# Patient Record
Sex: FEMALE | Race: ASIAN | NOT HISPANIC OR LATINO
[De-identification: names, ages, dates, MRNs, and addresses within clinical notes are randomized per-mention and may not be internally consistent; named-entity substitution may affect disease eponyms.]

---
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
## 2022-01-27 ENCOUNTER — APPOINTMENT (OUTPATIENT)
Dept: PEDIATRIC RHEUMATOLOGY | Facility: CLINIC | Age: 22
End: 2022-01-27
Payer: COMMERCIAL

## 2022-01-27 VITALS
DIASTOLIC BLOOD PRESSURE: 73 MMHG | BODY MASS INDEX: 17.31 KG/M2 | SYSTOLIC BLOOD PRESSURE: 114 MMHG | HEART RATE: 101 BPM | WEIGHT: 88.18 LBS | TEMPERATURE: 97.6 F | HEIGHT: 59.88 IN

## 2022-01-27 DIAGNOSIS — M79.89 PAIN IN RIGHT TOE(S): ICD-10-CM

## 2022-01-27 DIAGNOSIS — M79.675 PAIN IN LEFT TOE(S): ICD-10-CM

## 2022-01-27 DIAGNOSIS — M79.89 PAIN IN LEFT TOE(S): ICD-10-CM

## 2022-01-27 DIAGNOSIS — M79.674 PAIN IN RIGHT TOE(S): ICD-10-CM

## 2022-01-27 PROCEDURE — 99215 OFFICE O/P EST HI 40 MIN: CPT

## 2022-01-27 RX ORDER — NAPROXEN SODIUM 220 MG
220 TABLET ORAL
Qty: 120 | Refills: 2 | Status: COMPLETED | COMMUNITY
Start: 2021-10-28 | End: 2022-01-27

## 2022-01-27 RX ORDER — MELOXICAM 10 MG/1
10 CAPSULE ORAL
Qty: 30 | Refills: 2 | Status: COMPLETED | COMMUNITY
Start: 2022-01-27 | End: 2022-01-27

## 2022-01-27 NOTE — HISTORY OF PRESENT ILLNESS
[FreeTextEntry1] : Melo is here for follow-up today with Mom.\par \par Melo reports feeling well overall. She did have one episode of right sided facial swelling in 11/23/2021. Denies any concurrent illness or any triggers, but notable swelling of R. cheek with some mild dryness in her mouth. PCP prescribed her steroid taper with resolution of symptoms in 5 days. She has not had recurrence seen.\par \par Melo also reports that she had bilateral knee swelling earlier this month. She took Aleve BID for a week and has had resolution of her pain symptoms, but still has swelling of her right knee. She does not report any AM stiffness or any limitation due to her knee swelling. Denies any other joint symptoms. \par \par She denies any abdominal pain, vomiting, nausea, diarrhea, early satiety, or dysphagia. Denies xerostomia, xerophthalmia, or further swelling of cheeks. Has been compliant with HCQ qD and artificial tears PRN. Denies eye pain, vision changes/blurry vision, rashes, fevers, recent illnesses, alopecia, fatigue, constant thirst, or other joint symptoms. \par

## 2022-01-27 NOTE — SOCIAL HISTORY
[FreeTextEntry1] : Attending [employer] (has 2 more classes until completes her degree) and wants to study human services and mental health (maybe wants to go into health admin vs apply to [employer] accelerated nursing program. She is currently working in medical billing and accounting.

## 2022-01-27 NOTE — END OF VISIT
[FreeTextEntry3] : Agree with fellow as above.  \par \par I discussed this patient in a pre-clinic session with the fellow including review of clinical status, last labs, and relevant notes from other providers.  I also saw the patient and discussed history, completed an exam and discussed the treatment/management and follow-up together with the fellow.  \par   [Time Spent: ___ minutes] : I have spent [unfilled] minutes of time on the encounter.

## 2022-01-27 NOTE — REVIEW OF SYSTEMS
[Fever] : no fever [Wgt Loss (___ Lbs)] : no recent weight loss [Smokers in Home] : no one in home smokes [FreeTextEntry1] : Flu shot given in rheumatology office [id number]\par COVID vaccine x2 doses completed 5/2021

## 2022-01-27 NOTE — CONSULT LETTER
[FreeTextEntry2] : Blake Ji MD\par [address]\[place] [address]  [FreeTextEntry3] : Negrita Almeida DO\par Fellow, Pediatric Rheumatology\par [facility]\par

## 2022-01-27 NOTE — PHYSICAL EXAM
[Acute distress] : no acute distress [Rash] : no rash [Malar Erythema] : no malar erythema [Erythematous Conjunctiva] : nonerythematous conjunctiva [Erythematous Oropharynx] : nonerythematous oropharynx [Lesions] : no lesions [Erythematous] : not erythematous [Murmurs] : no murmurs [Peripheral Edema] : no peripheral edema  [FreeTextEntry1] : NAD, able to get on and off table without difficulty  [NumbJointsActiveArthritis] : 1 [NumbJointsLimitedMotion] : 0 [de-identified] : FROM C-Spine [de-identified] : no gross discrepancy

## 2022-01-28 ENCOUNTER — NON-APPOINTMENT (OUTPATIENT)
Age: 22
End: 2022-01-28

## 2022-01-28 LAB
25(OH)D3 SERPL-MCNC: 36 NG/ML
ALBUMIN SERPL ELPH-MCNC: 4.1 G/DL
ALP BLD-CCNC: 45 U/L
ALT SERPL-CCNC: 15 U/L
ANION GAP SERPL CALC-SCNC: 12 MMOL/L
AST SERPL-CCNC: 19 U/L
BILIRUB SERPL-MCNC: 0.3 MG/DL
BUN SERPL-MCNC: 8 MG/DL
C3 SERPL-MCNC: 114 MG/DL
C4 SERPL-MCNC: 15 MG/DL
CALCIUM SERPL-MCNC: 9 MG/DL
CHLORIDE SERPL-SCNC: 104 MMOL/L
CO2 SERPL-SCNC: 20 MMOL/L
CREAT SERPL-MCNC: 0.62 MG/DL
CRP SERPL-MCNC: <3 MG/L
FERRITIN SERPL-MCNC: 12 NG/ML
GLUCOSE SERPL-MCNC: 81 MG/DL
IRON SATN MFR SERPL: 14 %
IRON SERPL-MCNC: 62 UG/DL
POTASSIUM SERPL-SCNC: 4.2 MMOL/L
PROT SERPL-MCNC: 8.1 G/DL
SODIUM SERPL-SCNC: 136 MMOL/L
TIBC SERPL-MCNC: 439 UG/DL
UIBC SERPL-MCNC: 377 UG/DL

## 2022-02-01 LAB — DSDNA AB SER-ACNC: <12 IU/ML

## 2022-03-10 ENCOUNTER — APPOINTMENT (OUTPATIENT)
Dept: PEDIATRIC RHEUMATOLOGY | Facility: CLINIC | Age: 22
End: 2022-03-10
Payer: COMMERCIAL

## 2022-03-10 VITALS
DIASTOLIC BLOOD PRESSURE: 66 MMHG | BODY MASS INDEX: 17.51 KG/M2 | HEIGHT: 60.04 IN | TEMPERATURE: 98.1 F | SYSTOLIC BLOOD PRESSURE: 100 MMHG | HEART RATE: 89 BPM | WEIGHT: 90.39 LBS

## 2022-03-10 PROCEDURE — 20610 DRAIN/INJ JOINT/BURSA W/O US: CPT | Mod: GC

## 2022-03-10 PROCEDURE — 99215 OFFICE O/P EST HI 40 MIN: CPT | Mod: GC,25

## 2022-03-10 NOTE — PHYSICAL EXAM
[PERRLA] : ZACKARY [Eyelids] : normal eyelids [Pupils] : pupils were equal and round [Iris] : normal iris [Gums] : normal gums [Mucosa] : moist and pink mucosa [Palate] : normal palate [S1, S2 Present] : S1, S2 present [Cardiac Auscultation] : normal cardiac auscultation  [Peripheral Pulses] : positive peripheral pulses [Respiratory Effort] : normal respiratory effort [Clear to auscultation] : clear to auscultation [Soft] : soft [NonTender] : non tender [Non Distended] : non distended [Normal Bowel Sounds] : normal bowel sounds [No Hepatosplenomegaly] : no hepatosplenomegaly [No Abnormal Lymph Nodes Palpated] : no abnormal lymph nodes palpated [Digits] : normal digits [Muscle Strength] : normal muscle strength [Range Of Motion] : full range of motion [Gait] : normal gait [Joint effusions] : joint effusions [Cranial nerves grossly intact] : cranial nerves grossly intact [Intact Judgement] : intact judgement  [Insight Insight] : intact insight [1] : 1 [_______] : Knee: [unfilled]  [Thumbs bend back to reach forearm] : thumbs bend back to reach forearm [Hyperextension of elbows] : hyperextension of elbows  [Hyperextension of knees] : hyperextension of knees [Acute distress] : no acute distress [Rash] : no rash [Malar Erythema] : no malar erythema [Erythematous Conjunctiva] : nonerythematous conjunctiva [Erythematous Oropharynx] : nonerythematous oropharynx [Lesions] : no lesions [Erythematous] : not erythematous [Murmurs] : no murmurs [Peripheral Edema] : no peripheral edema  [FreeTextEntry1] : NAD, able to get on and off table without difficulty  [NumbJointsActiveArthritis] : 1 [NumbJointsLimitedMotion] : 0 [de-identified] : FROM C-Spine [de-identified] : no gross discrepancy

## 2022-03-10 NOTE — SOCIAL HISTORY
[Mother] : mother [Father] : father [___ Brothers] : [unfilled] brothers [College] : College [FreeTextEntry1] : Attending [employer] (has 2 more classes until completes her degree) and wants to study human services and mental health (maybe wants to go into health admin vs apply to [employer] accelerated nursing program. She is currently working in medical billing and accounting.

## 2022-03-10 NOTE — IMMUNIZATIONS
[Immunizations are up to date] : Immunizations are up to date [Records maintained by PMANMOL] : Records maintained by GRISEL [FreeTextEntry1] : Flu shot given in rheumatology office [id number]\par COVID vaccine x2 doses completed 5/2021 - booster 2/17/2022

## 2022-03-10 NOTE — PHYSICAL EXAM
[PERRLA] : ZACKARY [Eyelids] : normal eyelids [Pupils] : pupils were equal and round [Iris] : normal iris [Gums] : normal gums [Mucosa] : moist and pink mucosa [Palate] : normal palate [S1, S2 Present] : S1, S2 present [Cardiac Auscultation] : normal cardiac auscultation  [Peripheral Pulses] : positive peripheral pulses [Respiratory Effort] : normal respiratory effort [Clear to auscultation] : clear to auscultation [Soft] : soft [NonTender] : non tender [Non Distended] : non distended [Normal Bowel Sounds] : normal bowel sounds [No Hepatosplenomegaly] : no hepatosplenomegaly [No Abnormal Lymph Nodes Palpated] : no abnormal lymph nodes palpated [Digits] : normal digits [Muscle Strength] : normal muscle strength [Range Of Motion] : full range of motion [Gait] : normal gait [Joint effusions] : joint effusions [Cranial nerves grossly intact] : cranial nerves grossly intact [Intact Judgement] : intact judgement  [Insight Insight] : intact insight [1] : 1 [_______] : Knee: [unfilled]  [Thumbs bend back to reach forearm] : thumbs bend back to reach forearm [Hyperextension of elbows] : hyperextension of elbows  [Hyperextension of knees] : hyperextension of knees [Acute distress] : no acute distress [Rash] : no rash [Malar Erythema] : no malar erythema [Erythematous Conjunctiva] : nonerythematous conjunctiva [Erythematous Oropharynx] : nonerythematous oropharynx [Lesions] : no lesions [Erythematous] : not erythematous [Murmurs] : no murmurs [Peripheral Edema] : no peripheral edema  [FreeTextEntry1] : NAD, able to get on and off table without difficulty  [NumbJointsLimitedMotion] : 0 [NumbJointsActiveArthritis] : 1 [de-identified] : FROM C-Spine [de-identified] : no gross discrepancy

## 2022-03-10 NOTE — SOCIAL HISTORY
[Mother] : mother [Father] : father [___ Brothers] : [unfilled] brothers [College] : College [FreeTextEntry1] : Attending [employer] (has 2 more classes until completes her degree) and wants to study human services and mental health (maybe wants to go into health admin vs apply to [employer] accelerated nursing program. She is currently working in medical billing and accounting.

## 2022-03-10 NOTE — REVIEW OF SYSTEMS
Hpi Title: Evaluation of Skin Lesions How Severe Are Your Spot(S)?: moderate Have Your Spot(S) Been Treated In The Past?: has not been treated [NI] : Endocrine [Nl] : Hematologic/Lymphatic [Date of Last Ophto Exam: _____] : the patient's last Ophthalmology exam was [unfilled] [Appropriate Age Development] : development appropriate for age [Menarche] : ~T menarche [Fever] : no fever [Wgt Loss (___ Lbs)] : no recent weight loss [Smokers in Home] : no one in home smokes

## 2022-03-10 NOTE — IMMUNIZATIONS
[Immunizations are up to date] : Immunizations are up to date [Records maintained by PMANMOL] : Records maintained by GRISEL [FreeTextEntry1] : Flu shot given in rheumatology office [id number]\par COVID vaccine x2 doses completed 5/2021 - booster 2/17/2022

## 2022-03-10 NOTE — CONSULT LETTER
[Dear  ___] : Dear  [unfilled], [Courtesy Letter:] : I had the pleasure of seeing your patient, [unfilled], in my office today. [Consult Closing:] : Thank you very much for allowing me to participate in the care of this patient.  If you have any questions, please do not hesitate to contact me. [Please see my note below.] : Please see my note below. [Sincerely,] : Sincerely, [FreeTextEntry2] : Blake Ji MD\par [address]\[place] [address]  [FreeTextEntry3] : Negrita Almeida DO\par Fellow, Pediatric Rheumatology\par [facility]\par

## 2022-03-10 NOTE — REVIEW OF SYSTEMS
[NI] : Endocrine [Nl] : Hematologic/Lymphatic [Date of Last Ophto Exam: _____] : the patient's last Ophthalmology exam was [unfilled] [Appropriate Age Development] : development appropriate for age [Menarche] : ~T menarche [Fever] : no fever [Wgt Loss (___ Lbs)] : no recent weight loss [Smokers in Home] : no one in home smokes

## 2022-03-10 NOTE — HISTORY OF PRESENT ILLNESS
[Noncontributory] : The patient's family history was noncontributory [Unlimited ADLs] : able to do activities of daily living without limitations [Unlimited Sports] : able to participate in sports without limitations [FreeTextEntry1] : Melo is here for follow-up today. Mom joined via [employer] today. \par \par Melo reports that she continues to have right knee swelling since our last encounter. She denies any pain or limping, but reports stiffness after sitting with her legs crossed. Denies other joint symptoms. Has been taking Meloxicam daily ~5 weeks. \par \par In early February, Melo had a few days of fevers, sore throat, and swelling of her tonsils (diagnosed with ?tonsillitis). PCP prescribed Prednisone x5 days, antibiotics, cough medicine, and ibuprofen. She was COVID negative at this time. \par \olamide Denies any abdominal pain, vomiting, nausea, diarrhea, early satiety, or dysphagia. Denies xerostomia, xerophthalmia, or further swelling of cheeks. Has been compliant with HCQ qD and artificial tears PRN. Denies eye pain, vision changes/blurry vision, rashes, fevers, recent illnesses, alopecia, fatigue, constant thirst, or other joint symptoms.\par

## 2022-03-10 NOTE — HISTORY OF PRESENT ILLNESS
[Noncontributory] : The patient's family history was noncontributory [Unlimited ADLs] : able to do activities of daily living without limitations [Unlimited Sports] : able to participate in sports without limitations [FreeTextEntry1] : Melo is here for follow-up today. Mom joined via [employer] today. \par \par Melo reports that she continues to have right knee swelling since our last encounter. She denies any pain or limping, but reports stiffness after sitting with her legs crossed. Denies other joint symptoms. Has been taking Meloxicam daily ~5 weeks. \par \par In early February, Melo had a few days of fevers, sore throat, and swelling of her tonsils (diagnosed with ?tonsillitis). PCP prescribed Prednisone x5 days, antibiotics, cough medicine, and ibuprofen. She was COVID negative at this time. \par \olamide Denies any abdominal pain, vomiting, nausea, diarrhea, early satiety, or dysphagia. Denies xerostomia, xerophthalmia, or further swelling of cheeks. Has been compliant with HCQ qD and artificial tears PRN. Denies eye pain, vision changes/blurry vision, rashes, fevers, recent illnesses, alopecia, fatigue, constant thirst, or other joint symptoms.\par

## 2022-03-10 NOTE — CONSULT LETTER
[Dear  ___] : Dear  [unfilled], [Courtesy Letter:] : I had the pleasure of seeing your patient, [unfilled], in my office today. [Consult Closing:] : Thank you very much for allowing me to participate in the care of this patient.  If you have any questions, please do not hesitate to contact me. [Please see my note below.] : Please see my note below. [Sincerely,] : Sincerely, [FreeTextEntry2] : Blake Ji MD\par [address]\[place] [address]  [FreeTextEntry3] : Negrita Almeida DO\par Fellow, Pediatric Rheumatology\par [facility]\par

## 2022-03-11 NOTE — PROCEDURE
[Today's Date:] : Date: [unfilled] [Risks] : risks [Parent] : parent [Benefits] : benefits [Consent Obtained] : written consent was obtained prior to the procedure and is detailed in the patient's record [Patient] : Prior to the start of the procedure a time out was taken and the identity of the patient was confirmed via name and date of birth with the patient. The correct site and the procedure to be performed were confirmed. The correct side was confirmed if applicable. The availability of the correct equipment was verified [Therapeutic] : therapeutic [#1 Site: ______] : #1 site identified in the [unfilled] [LMX-4] : LMX-4 [Chlorhexidine] : chlorhexidine [Kenolog ___mg] : [unfilled] mg of kenolog [22 gauge 1.5 inch] : A 22 gauge 1.5 inch needle was used [Tolerated Well] : The patient tolerated the procedure well [Reports Improvement in Symptoms] : reports improvement in symptoms [No Complications] : There were no complications [Instructions Given] : Handouts/patient instructions were given to patient [Patient Instructed to Call] : Patient was instructed to call if redness at site, a decrease in range of motion or an increase in pain is noted after procedure. [de-identified] : Lot [id number], EXP APR 2023 [FreeTextEntry8] : . [de-identified] : Patient advised of the followin)rest the joint injected for 24 hours - minimal movement during that time of the joint; 2) leave the bandage in place for 24 hours, may bathe/shower after removing the bandage; 3) apply ice over the bandage as needed for pain, may also use Tylenol for pain if needed; 4) call if the area becomes red or if any fever develops. [FreeTextEntry1] : \olamide Alejo is a 20yo F here for follow-up of Sjogren's with persistent R. Knee arthritis despite NSAID use. Discussed IAC with patient, who was in agreement to pursue injection today.

## 2022-03-11 NOTE — PROCEDURE
[Today's Date:] : Date: [unfilled] [Risks] : risks [Parent] : parent [Benefits] : benefits [Consent Obtained] : written consent was obtained prior to the procedure and is detailed in the patient's record [Patient] : Prior to the start of the procedure a time out was taken and the identity of the patient was confirmed via name and date of birth with the patient. The correct site and the procedure to be performed were confirmed. The correct side was confirmed if applicable. The availability of the correct equipment was verified [Therapeutic] : therapeutic [#1 Site: ______] : #1 site identified in the [unfilled] [LMX-4] : LMX-4 [Chlorhexidine] : chlorhexidine [Kenolog ___mg] : [unfilled] mg of kenolog [22 gauge 1.5 inch] : A 22 gauge 1.5 inch needle was used [Tolerated Well] : The patient tolerated the procedure well [Reports Improvement in Symptoms] : reports improvement in symptoms [No Complications] : There were no complications [Instructions Given] : Handouts/patient instructions were given to patient [Patient Instructed to Call] : Patient was instructed to call if redness at site, a decrease in range of motion or an increase in pain is noted after procedure. [de-identified] : Lot [id number], EXP APR 2023 [FreeTextEntry8] : . [de-identified] : Patient advised of the followin)rest the joint injected for 24 hours - minimal movement during that time of the joint; 2) leave the bandage in place for 24 hours, may bathe/shower after removing the bandage; 3) apply ice over the bandage as needed for pain, may also use Tylenol for pain if needed; 4) call if the area becomes red or if any fever develops. [FreeTextEntry1] : \olamide Alejo is a 20yo F here for follow-up of Sjogren's with persistent R. Knee arthritis despite NSAID use. Discussed IAC with patient, who was in agreement to pursue injection today.

## 2022-03-11 NOTE — END OF VISIT
[] : Fellow [Fellow] : Fellow [FreeTextEntry2] : Melo continues with pain and swelling of R knee. She has not been recently ill and has no fever, no rash, no URI symptoms. NO nv.d. no other joint involvement. No blood in urine/stool.\par \par Today she is here for IAC of R knee. Procedure described above per Dr Almeida. Plan otherwise well outlined per Dr Almeida above.\par  Tolerated procedure well.\par \par RTC 6 weeks or sooner with any concerns.\par \par I discussed this patient in a pre-clinic session with the fellow including clinical status and last set of laboratory testing results. I also saw the patient and discussed history, completed an exam and discussed the plan together with the fellow.\par \par Total time spent today included reviewing prior notes, results, and time with patient/parent.\par Time spent -     25 minutes\par

## 2022-03-25 ENCOUNTER — NON-APPOINTMENT (OUTPATIENT)
Age: 22
End: 2022-03-25

## 2022-04-11 PROBLEM — Z11.59 SCREENING FOR VIRAL DISEASE: Status: ACTIVE | Noted: 2020-06-22

## 2022-04-28 ENCOUNTER — APPOINTMENT (OUTPATIENT)
Dept: PEDIATRIC RHEUMATOLOGY | Facility: CLINIC | Age: 22
End: 2022-04-28
Payer: COMMERCIAL

## 2022-04-28 VITALS
TEMPERATURE: 98.1 F | BODY MASS INDEX: 17.31 KG/M2 | SYSTOLIC BLOOD PRESSURE: 122 MMHG | HEART RATE: 112 BPM | WEIGHT: 88.18 LBS | DIASTOLIC BLOOD PRESSURE: 80 MMHG | HEIGHT: 59.92 IN

## 2022-04-28 PROCEDURE — 99215 OFFICE O/P EST HI 40 MIN: CPT | Mod: GC

## 2022-04-28 RX ORDER — CHOLECALCIFEROL (VITAMIN D3) 1250 MCG
1.25 MG CAPSULE ORAL
Qty: 8 | Refills: 0 | Status: COMPLETED | COMMUNITY
Start: 2020-09-10 | End: 2022-04-28

## 2022-04-28 RX ORDER — OMEPRAZOLE 20 MG/1
20 TABLET, DELAYED RELEASE ORAL
Qty: 30 | Refills: 0 | Status: COMPLETED | COMMUNITY
Start: 2020-05-29 | End: 2022-04-28

## 2022-04-28 RX ORDER — CHLORHEXIDINE GLUCONATE 4 %
325 (65 FE) LIQUID (ML) TOPICAL TWICE DAILY
Qty: 60 | Refills: 2 | Status: COMPLETED | COMMUNITY
Start: 2019-12-23 | End: 2022-04-28

## 2022-05-06 NOTE — SOCIAL HISTORY
[FreeTextEntry1] : Attending [employer] (has 2 more classes until completes her degree) and wants to study human services and mental health (maybe wants to go into health admin vs apply to [employer] accelerated nursing program. She is currently working in medical billing and accounting.

## 2022-05-06 NOTE — IMMUNIZATIONS
[FreeTextEntry1] : Flu shot given in rheumatology office [id number]\par COVID vaccine x2 doses completed 5/2021; booster 2/17/2022 [no AEs]

## 2022-05-06 NOTE — PHYSICAL EXAM
[Acute distress] : no acute distress [Rash] : no rash [Malar Erythema] : no malar erythema [Erythematous Conjunctiva] : nonerythematous conjunctiva [Erythematous Oropharynx] : nonerythematous oropharynx [Lesions] : no lesions [Erythematous] : not erythematous [Murmurs] : no murmurs [Peripheral Edema] : no peripheral edema  [FreeTextEntry1] : NAD, able to get on and off table without difficulty  [NumbJointsActiveArthritis] : 1 [NumbJointsLimitedMotion] : 0 [de-identified] : FROM C-Spine [de-identified] : no gross discrepancy

## 2022-05-06 NOTE — REVIEW OF SYSTEMS
[Fever] : no fever [Wgt Loss (___ Lbs)] : no recent weight loss [Smokers in Home] : no one in home smokes

## 2022-05-06 NOTE — CONSULT LETTER
[FreeTextEntry2] : Blake Ji MD\par [address]\[place] [address]  [FreeTextEntry3] : Negrita Almeida DO\par Fellow, Pediatric Rheumatology\par [facility]\par

## 2022-05-06 NOTE — END OF VISIT
[FreeTextEntry3] : I discussed this patient in a pre-clinic session with the fellow including clinical status and last set of laboratory testing results. I also saw the patient and discussed history, completed an exam and discussed the plan together with the fellow.\par \par Total time spent today included reviewing prior notes, results, and time with patient/parent.\par Time spent -    42  minutes\par

## 2022-05-06 NOTE — HISTORY OF PRESENT ILLNESS
[FreeTextEntry1] : Melo is here for follow-up today with Mom.\par \par Melo is s/p R. knee IAC 3/2022. She reports that she had pain for 1.5 weeks after her injection, but has not had more pain since. She does think she still has some swelling of her right knee. Denies AM stiffness, limping, or difficulty with ADLs. Has not been taking any NSAIDs since injection.  \par \par Otherwise, reports feeling well. Denies any additional episodes of facial swelling [last in 11/23/2021 on R. side [PCP prescribed steroids x5 days]. \par \par She denies any abdominal pain, vomiting, nausea, diarrhea, early satiety, or dysphagia. Denies xerostomia, xerophthalmia, or further swelling of cheeks. Has been compliant with HCQ qD and artificial tears PRN. Denies eye pain, vision changes/blurry vision, rashes, fevers, recent illnesses, alopecia, fatigue, constant thirst, or other joint symptoms. Denies Raynaud symptoms.  \par

## 2022-05-23 ENCOUNTER — NON-APPOINTMENT (OUTPATIENT)
Age: 22
End: 2022-05-23

## 2022-06-02 ENCOUNTER — APPOINTMENT (OUTPATIENT)
Dept: PEDIATRIC RHEUMATOLOGY | Facility: CLINIC | Age: 22
End: 2022-06-02

## 2022-07-12 ENCOUNTER — NON-APPOINTMENT (OUTPATIENT)
Age: 22
End: 2022-07-12

## 2022-07-13 ENCOUNTER — NON-APPOINTMENT (OUTPATIENT)
Age: 22
End: 2022-07-13

## 2022-08-18 ENCOUNTER — APPOINTMENT (OUTPATIENT)
Dept: PEDIATRIC RHEUMATOLOGY | Facility: CLINIC | Age: 22
End: 2022-08-18

## 2022-08-18 VITALS
TEMPERATURE: 98.3 F | HEART RATE: 76 BPM | SYSTOLIC BLOOD PRESSURE: 110 MMHG | DIASTOLIC BLOOD PRESSURE: 75 MMHG | BODY MASS INDEX: 16.29 KG/M2 | HEIGHT: 59.65 IN | WEIGHT: 82.98 LBS

## 2022-08-18 DIAGNOSIS — R63.4 ABNORMAL WEIGHT LOSS: ICD-10-CM

## 2022-08-18 LAB
BASOPHILS # BLD AUTO: 0.04 K/UL
BASOPHILS NFR BLD AUTO: 0.8 %
EOSINOPHIL # BLD AUTO: 0.05 K/UL
EOSINOPHIL NFR BLD AUTO: 1 %
FERRITIN SERPL-MCNC: 690 NG/ML
HCT VFR BLD CALC: 35.4 %
HGB BLD-MCNC: 11.1 G/DL
IMM GRANULOCYTES NFR BLD AUTO: 0.2 %
LYMPHOCYTES # BLD AUTO: 2.33 K/UL
LYMPHOCYTES NFR BLD AUTO: 47.4 %
MAN DIFF?: NORMAL
MCHC RBC-ENTMCNC: 26.8 PG
MCHC RBC-ENTMCNC: 31.4 GM/DL
MCV RBC AUTO: 85.5 FL
MONOCYTES # BLD AUTO: 0.41 K/UL
MONOCYTES NFR BLD AUTO: 8.3 %
NEUTROPHILS # BLD AUTO: 2.08 K/UL
NEUTROPHILS NFR BLD AUTO: 42.3 %
PLATELET # BLD AUTO: 247 K/UL
RBC # BLD: 4.14 M/UL
RBC # FLD: 13.7 %
RHEUMATOID FACT SER QL: 61 IU/ML
WBC # FLD AUTO: 4.92 K/UL

## 2022-08-18 PROCEDURE — 99214 OFFICE O/P EST MOD 30 MIN: CPT

## 2022-08-18 RX ORDER — MELOXICAM 15 MG/1
15 TABLET ORAL
Qty: 90 | Refills: 0 | Status: COMPLETED | COMMUNITY
Start: 2022-01-27 | End: 2022-08-18

## 2022-08-18 NOTE — REVIEW OF SYSTEMS
[NI] : Endocrine [Nl] : Hematologic/Lymphatic [Date of Last Ophto Exam: _____] : the patient's last Ophthalmology exam was [unfilled] [Menarche] : ~T menarche [Appropriate Age Development] : development appropriate for age [Fever] : no fever [Wgt Loss (___ Lbs)] : recent [unfilled] lb weight loss [LMP: ________] : the patient's last menstrual period was [unfilled] [Smokers in Home] : no one in home smokes

## 2022-08-18 NOTE — IMMUNIZATIONS
[Immunizations are up to date] : Immunizations are up to date [Records maintained by PMAN[facility]] : Records maintained by GRISEL [FreeTextEntry1] : Flu shot given in rheumatology office [id number]\par COVID vaccine x2 doses completed 5/2021; booster 2/17/2022 [no AEs]

## 2022-08-18 NOTE — HISTORY OF PRESENT ILLNESS
[Noncontributory] : The patient's family history was noncontributory [Unlimited ADLs] : able to do activities of daily living without limitations [Unlimited Sports] : able to participate in sports without limitations [FreeTextEntry1] : Melo is here for follow-up today with Mom.\par \par Melo is s/p R. knee IAC 3/2022. She was taking standing Meloxicam up until a month ago. Reports feeling well now without any incidence or recurrence of joint pain, swelling or AM stiffness. Denies limping or difficulty with ADLs. \par \par Otherwise, reports feeling well. Denies any additional episodes of facial swelling [last in 11/23/2021 on R. side [PCP prescribed steroids x5 days]. \par aleksandra Had 2 episodes of illnesses this summer: once early in June (cough symptoms) and once in July (GI bug). Has been well since. \par \olamide Has been seeing adult hematology - has started iron infusions and feeling much better and less fatigued. Has had about 6 infusions now, due for Iron infusion #7 soon. \par \par She denies any abdominal pain, vomiting, nausea, diarrhea, early satiety, dysphagia, decrease in appetite, or intentional weight loss. Denies xerostomia, xerophthalmia, or further swelling of cheeks. Has been compliant with HCQ qD and artificial tears PRN. Denies eye pain, vision changes/blurry vision, rashes, fevers, recent illnesses, alopecia, fatigue, constant thirst, other joint symptoms, hematochezia, or hematuria. Denies Raynaud symptoms.\par

## 2022-08-18 NOTE — CONSULT LETTER
[Dear  ___] : Dear  [unfilled], [Courtesy Letter:] : I had the pleasure of seeing your patient, [unfilled], in my office today. [Please see my note below.] : Please see my note below. [Consult Closing:] : Thank you very much for allowing me to participate in the care of this patient.  If you have any questions, please do not hesitate to contact me. [Sincerely,] : Sincerely, [FreeTextEntry2] : Blake Ji MD\par [address]\[place] [address]  [FreeTextEntry3] : Negrita Almeida DO\par Attending Physician, Pediatric Rheumatology\par [facility]\par \par  [DrDenise  ___] : Dr. MAURER

## 2022-08-18 NOTE — SOCIAL HISTORY
[Mother] : mother [Father] : father [___ Brothers] : [unfilled] brothers [College] : College [FreeTextEntry1] : Attending [employer] health sciences; taking online MA program this year (4-mo); wants to ultimately goes into nursing. 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

## 2022-08-18 NOTE — PHYSICAL EXAM
[PERRLA] : ZACKARY [Eyelids] : normal eyelids [Pupils] : pupils were equal and round [Iris] : normal iris [Gums] : normal gums [Mucosa] : moist and pink mucosa [Palate] : normal palate [S1, S2 Present] : S1, S2 present [Cardiac Auscultation] : normal cardiac auscultation  [Respiratory Effort] : normal respiratory effort [Clear to auscultation] : clear to auscultation [Soft] : soft [NonTender] : non tender [Non Distended] : non distended [Normal Bowel Sounds] : normal bowel sounds [No Hepatosplenomegaly] : no hepatosplenomegaly [No Abnormal Lymph Nodes Palpated] : no abnormal lymph nodes palpated [Digits] : normal digits [Muscle Strength] : normal muscle strength [Range Of Motion] : full range of motion [Gait] : normal gait [Cranial nerves grossly intact] : cranial nerves grossly intact [Intact Judgement] : intact judgement  [Insight Insight] : intact insight [1] : 1 [_______] : Knee: [unfilled]  [Thumbs bend back to reach forearm] : thumbs bend back to reach forearm [Hyperextension of elbows] : hyperextension of elbows  [Hyperextension of knees] : hyperextension of knees [Acute distress] : no acute distress [Rash] : no rash [Malar Erythema] : no malar erythema [Erythematous Conjunctiva] : nonerythematous conjunctiva [Erythematous Oropharynx] : nonerythematous oropharynx [Lesions] : no lesions [Erythematous] : not erythematous [Murmurs] : no murmurs [Peripheral Edema] : no peripheral edema  [Joint effusions] : no joint effusions [FreeTextEntry1] : NAD, able to get on and off table without difficulty  [de-identified] : no objective arthritis  [NumbJointsActiveArthritis] : 0 [NumbJointsLimitedMotion] : 0 [de-identified] : FROM C-Spine [de-identified] : negative FABERE bilaterally  [de-identified] : no gross discrepancy

## 2022-08-19 LAB
ALBUMIN SERPL ELPH-MCNC: 4.8 G/DL
ALP BLD-CCNC: 43 U/L
ALT SERPL-CCNC: 50 U/L
ANION GAP SERPL CALC-SCNC: 13 MMOL/L
APPEARANCE: ABNORMAL
AST SERPL-CCNC: 34 U/L
B2 GLYCOPROT1 IGA SERPL IA-ACNC: <5 SAU
B2 GLYCOPROT1 IGG SER-ACNC: <5 SGU
B2 GLYCOPROT1 IGM SER-ACNC: <5 SMU
BACTERIA: NEGATIVE
BILIRUB SERPL-MCNC: 0.5 MG/DL
BILIRUBIN URINE: NEGATIVE
BLOOD URINE: NEGATIVE
BUN SERPL-MCNC: 7 MG/DL
C3 SERPL-MCNC: 113 MG/DL
C4 SERPL-MCNC: 23 MG/DL
CALCIUM SERPL-MCNC: 9.6 MG/DL
CARDIOLIPIN IGM SER-MCNC: 20.4 MPL
CARDIOLIPIN IGM SER-MCNC: <5 GPL
CHLORIDE SERPL-SCNC: 101 MMOL/L
CO2 SERPL-SCNC: 23 MMOL/L
COLOR: NORMAL
CONFIRM: 27 SEC
CREAT SERPL-MCNC: 0.6 MG/DL
CREAT SPEC-SCNC: 141 MG/DL
CREAT/PROT UR: 0.1 RATIO
CRP SERPL-MCNC: <3 MG/L
DEPRECATED CARDIOLIPIN IGA SER: <5 APL
DRVVT IMM 1:2 NP PPP: NORMAL
DRVVT SCREEN TO CONFIRM RATIO: 0.98 RATIO
EGFR: 132 ML/MIN/1.73M2
ENA RNP AB SER IA-ACNC: <0.2 AL
ENA SM AB SER IA-ACNC: <0.2 AL
ENA SS-A AB SER IA-ACNC: >8 AL
ENA SS-B AB SER IA-ACNC: 0.3 AL
ERYTHROCYTE [SEDIMENTATION RATE] IN BLOOD BY WESTERGREN METHOD: 30 MM/HR
GLUCOSE QUALITATIVE U: NEGATIVE
GLUCOSE SERPL-MCNC: 93 MG/DL
HYALINE CASTS: 0 /LPF
IRON SATN MFR SERPL: 37 %
IRON SERPL-MCNC: 117 UG/DL
KETONES URINE: NEGATIVE
LEUKOCYTE ESTERASE URINE: NEGATIVE
MICROSCOPIC-UA: NORMAL
NITRITE URINE: NEGATIVE
PH URINE: 6.5
POTASSIUM SERPL-SCNC: 4.2 MMOL/L
PROT SERPL-MCNC: 8.6 G/DL
PROT UR-MCNC: 13 MG/DL
PROTEIN URINE: ABNORMAL
RED BLOOD CELLS URINE: 1 /HPF
SCREEN DRVVT: 31.3 SEC
SODIUM SERPL-SCNC: 137 MMOL/L
SPECIFIC GRAVITY URINE: 1.02
SQUAMOUS EPITHELIAL CELLS: 17 /HPF
TIBC SERPL-MCNC: 314 UG/DL
UIBC SERPL-MCNC: 197 UG/DL
UROBILINOGEN URINE: NORMAL
WHITE BLOOD CELLS URINE: 5 /HPF

## 2022-08-22 LAB
CARDIOLIPIN AB SER IA-ACNC: NEGATIVE
DSDNA AB SER-ACNC: 12 IU/ML

## 2022-08-26 ENCOUNTER — APPOINTMENT (OUTPATIENT)
Dept: RHEUMATOLOGY | Facility: CLINIC | Age: 22
End: 2022-08-26

## 2022-09-06 NOTE — H&P PST PEDIATRIC - NS CHILD LIFE RESPONSE TO INTERVENTION
Increased/knowledge of hospitalization and/ or illness Graft Cartilage Fenestration Text: The exposed cartilage was fenestrated with a 3mm punch biopsy to help facilitate wound healing, and decrease infection and chondritis.

## 2022-11-25 ENCOUNTER — APPOINTMENT (OUTPATIENT)
Dept: PEDIATRIC RHEUMATOLOGY | Facility: CLINIC | Age: 22
End: 2022-11-25

## 2022-11-25 VITALS
HEIGHT: 60 IN | DIASTOLIC BLOOD PRESSURE: 76 MMHG | BODY MASS INDEX: 16.36 KG/M2 | WEIGHT: 83.33 LBS | HEART RATE: 106 BPM | SYSTOLIC BLOOD PRESSURE: 115 MMHG

## 2022-11-25 DIAGNOSIS — Z86.2 PERSONAL HISTORY OF DISEASES OF THE BLOOD AND BLOOD-FORMING ORGANS AND CERTAIN DISORDERS INVOLVING THE IMMUNE MECHANISM: ICD-10-CM

## 2022-11-25 DIAGNOSIS — Z87.19 PERSONAL HISTORY OF OTHER DISEASES OF THE DIGESTIVE SYSTEM: ICD-10-CM

## 2022-11-25 DIAGNOSIS — M24.9 JOINT DERANGEMENT, UNSPECIFIED: ICD-10-CM

## 2022-11-25 DIAGNOSIS — I73.00 RAYNAUD'S SYNDROME W/OUT GANGRENE: ICD-10-CM

## 2022-11-25 DIAGNOSIS — M17.11 UNILATERAL PRIMARY OSTEOARTHRITIS, RIGHT KNEE: ICD-10-CM

## 2022-11-25 PROCEDURE — 99214 OFFICE O/P EST MOD 30 MIN: CPT

## 2022-11-25 RX ORDER — AMOXICILLIN 500 MG/1
500 CAPSULE ORAL
Qty: 20 | Refills: 0 | Status: DISCONTINUED | COMMUNITY
Start: 2022-10-20

## 2022-11-25 RX ORDER — PREDNISONE 5 MG/1
5 TABLET ORAL
Qty: 5 | Refills: 0 | Status: COMPLETED | COMMUNITY
Start: 2022-10-20

## 2022-11-25 RX ORDER — GENTAMICIN SULFATE 3 MG/ML
0.3 SOLUTION OPHTHALMIC
Qty: 5 | Refills: 0 | Status: COMPLETED | COMMUNITY
Start: 2022-10-20

## 2022-11-25 RX ORDER — POLYVINYL ALCOHOL 14 MG/ML
1.4 SOLUTION/ DROPS OPHTHALMIC
Qty: 1 | Refills: 5 | Status: COMPLETED | COMMUNITY
Start: 2017-06-22 | End: 2022-11-25

## 2022-11-25 RX ORDER — SAW PALMETTO 160 MG
500 CAPSULE ORAL
Qty: 30 | Refills: 0 | Status: COMPLETED | COMMUNITY
Start: 2022-10-20

## 2022-11-25 RX ORDER — OMEPRAZOLE 20 MG/1
20 CAPSULE, DELAYED RELEASE ORAL
Qty: 30 | Refills: 0 | Status: COMPLETED | COMMUNITY
Start: 2022-09-13

## 2022-11-25 RX ORDER — ERGOCALCIFEROL 1.25 MG/1
1.25 MG CAPSULE, LIQUID FILLED ORAL
Qty: 4 | Refills: 0 | Status: COMPLETED | COMMUNITY
Start: 2022-05-17

## 2022-11-25 RX ORDER — NORETHINDRONE ACETATE AND ETHINYL ESTRADIOL AND FERROUS FUMARATE 1MG-20(21)
1-20 KIT ORAL
Qty: 28 | Refills: 0 | Status: COMPLETED | COMMUNITY
Start: 2022-07-01

## 2022-11-25 NOTE — HISTORY OF PRESENT ILLNESS
[Noncontributory] : The patient's family history was noncontributory [Unlimited ADLs] : able to do activities of daily living without limitations [Unlimited Sports] : able to participate in sports without limitations [FreeTextEntry1] : Melo is here for follow-up today. \par \par Melo is feeling well today without any joint symptoms. Has not needed Meloxicam or any OTC analgesics. Denies any additional episodes of facial swelling [last in 11/23/2021 on R. side – PCP prescribed steroids x5 days]. \par \par Completed Fe transfusions per adult hematology – completed 13 infusions and reports feeling better. Reportedly Hgb is improved. \par \par Currently, denies any abdominal pain, vomiting, nausea, diarrhea, early satiety, dysphagia, decrease in appetite, or intentional weight loss. Denies xerostomia, xerophthalmia, or further swelling of cheeks. Has been compliant with HCQ qD and artificial tears PRN. Denies eye pain, vision changes/blurry vision, rashes, fevers, recent illnesses, alopecia, fatigue, constant thirst, other joint symptoms, hematochezia, or hematuria. Denies Raynaud symptoms.\par

## 2022-11-25 NOTE — PHYSICAL EXAM
[PERRLA] : ZACKARY [Eyelids] : normal eyelids [Pupils] : pupils were equal and round [Iris] : normal iris [Gums] : normal gums [Mucosa] : moist and pink mucosa [Palate] : normal palate [S1, S2 Present] : S1, S2 present [Cardiac Auscultation] : normal cardiac auscultation  [Respiratory Effort] : normal respiratory effort [Clear to auscultation] : clear to auscultation [Soft] : soft [NonTender] : non tender [Non Distended] : non distended [Normal Bowel Sounds] : normal bowel sounds [No Hepatosplenomegaly] : no hepatosplenomegaly [No Abnormal Lymph Nodes Palpated] : no abnormal lymph nodes palpated [Digits] : normal digits [Muscle Strength] : normal muscle strength [Range Of Motion] : full range of motion [Gait] : normal gait [Cranial nerves grossly intact] : cranial nerves grossly intact [Intact Judgement] : intact judgement  [Insight Insight] : intact insight [1] : 1 [_______] : Knee: [unfilled]  [Thumbs bend back to reach forearm] : thumbs bend back to reach forearm [Hyperextension of elbows] : hyperextension of elbows  [Hyperextension of knees] : hyperextension of knees [Acute distress] : no acute distress [Rash] : no rash [Malar Erythema] : no malar erythema [Erythematous Conjunctiva] : nonerythematous conjunctiva [Erythematous Oropharynx] : nonerythematous oropharynx [Lesions] : no lesions [Erythematous] : not erythematous [Murmurs] : no murmurs [Peripheral Edema] : no peripheral edema  [Joint effusions] : no joint effusions [FreeTextEntry1] : NAD, able to get on and off table without difficulty  [de-identified] : no objective arthritis  [NumbJointsActiveArthritis] : 0 [NumbJointsLimitedMotion] : 0 [de-identified] : FROM C-Spine [de-identified] : negative FABERE bilaterally  [de-identified] : no gross discrepancy

## 2022-11-25 NOTE — IMMUNIZATIONS
[Immunizations are up to date] : Immunizations are up to date [Records maintained by PMANMOL] : Records maintained by GRISEL [FreeTextEntry1] : Flu shot given in rheumatology office [id number]\par COVID vaccine x2 doses completed 5/2021; booster 2/17/2022 [no AEs]

## 2022-11-25 NOTE — REVIEW OF SYSTEMS
[NI] : Endocrine [Nl] : Hematologic/Lymphatic [Date of Last Ophto Exam: _____] : the patient's last Ophthalmology exam was [unfilled] [Menarche] : ~T menarche [Appropriate Age Development] : development appropriate for age [Fever] : no fever [Smokers in Home] : no one in home smokes

## 2022-11-25 NOTE — SOCIAL HISTORY
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

## 2022-11-25 NOTE — CONSULT LETTER
[Dear  ___] : Dear  [unfilled], [Courtesy Letter:] : I had the pleasure of seeing your patient, [unfilled], in my office today. [Please see my note below.] : Please see my note below. [Consult Closing:] : Thank you very much for allowing me to participate in the care of this patient.  If you have any questions, please do not hesitate to contact me. [Sincerely,] : Sincerely, [DrDenise  ___] : Dr. MAURER [FreeTextEntry2] : Blake Ji MD\par [address]\[place] [address]  [FreeTextEntry1] : \par Please notify my office if patient is requiring oral prednisone for symptomatic relief. Thank you.  [FreeTextEntry3] : Negrita Almeida DO\par Attending Physician, Pediatric Rheumatology\par [facility]\par \par

## 2022-11-28 LAB
ALBUMIN SERPL ELPH-MCNC: 4.5 G/DL
ALP BLD-CCNC: 39 U/L
ALT SERPL-CCNC: 13 U/L
ANION GAP SERPL CALC-SCNC: 9 MMOL/L
APPEARANCE: ABNORMAL
AST SERPL-CCNC: 18 U/L
BACTERIA: NEGATIVE
BASOPHILS # BLD AUTO: 0.02 K/UL
BASOPHILS NFR BLD AUTO: 0.5 %
BILIRUB SERPL-MCNC: 0.4 MG/DL
BILIRUBIN URINE: NEGATIVE
BLOOD URINE: NEGATIVE
BUN SERPL-MCNC: 6 MG/DL
C3 SERPL-MCNC: 100 MG/DL
C4 SERPL-MCNC: 21 MG/DL
CALCIUM SERPL-MCNC: 9.3 MG/DL
CHLORIDE SERPL-SCNC: 103 MMOL/L
CO2 SERPL-SCNC: 25 MMOL/L
COLOR: YELLOW
CREAT SERPL-MCNC: 0.59 MG/DL
CREAT SPEC-SCNC: 273 MG/DL
CREAT/PROT UR: 0.1 RATIO
CRP SERPL-MCNC: <3 MG/L
DSDNA AB SER-ACNC: <12 IU/ML
EGFR: 132 ML/MIN/1.73M2
EOSINOPHIL # BLD AUTO: 0.06 K/UL
EOSINOPHIL NFR BLD AUTO: 1.4 %
ERYTHROCYTE [SEDIMENTATION RATE] IN BLOOD BY WESTERGREN METHOD: 18 MM/HR
GLUCOSE QUALITATIVE U: NEGATIVE
GLUCOSE SERPL-MCNC: 89 MG/DL
HCT VFR BLD CALC: 33.6 %
HGB BLD-MCNC: 10.6 G/DL
HYALINE CASTS: 0 /LPF
IMM GRANULOCYTES NFR BLD AUTO: 0.2 %
KETONES URINE: NEGATIVE
LEUKOCYTE ESTERASE URINE: NEGATIVE
LYMPHOCYTES # BLD AUTO: 1.98 K/UL
LYMPHOCYTES NFR BLD AUTO: 45.6 %
MAN DIFF?: NORMAL
MCHC RBC-ENTMCNC: 27.5 PG
MCHC RBC-ENTMCNC: 31.5 GM/DL
MCV RBC AUTO: 87.3 FL
MICROSCOPIC-UA: NORMAL
MONOCYTES # BLD AUTO: 0.34 K/UL
MONOCYTES NFR BLD AUTO: 7.8 %
NEUTROPHILS # BLD AUTO: 1.93 K/UL
NEUTROPHILS NFR BLD AUTO: 44.5 %
NITRITE URINE: NEGATIVE
PH URINE: 6
PLATELET # BLD AUTO: 222 K/UL
POTASSIUM SERPL-SCNC: 4 MMOL/L
PROT SERPL-MCNC: 7.6 G/DL
PROT UR-MCNC: 19 MG/DL
PROTEIN URINE: ABNORMAL
RBC # BLD: 3.85 M/UL
RBC # FLD: 12.5 %
RED BLOOD CELLS URINE: 3 /HPF
RHEUMATOID FACT SER QL: 49 IU/ML
SODIUM SERPL-SCNC: 137 MMOL/L
SPECIFIC GRAVITY URINE: 1.03
SQUAMOUS EPITHELIAL CELLS: 21 /HPF
URINE COMMENTS: NORMAL
UROBILINOGEN URINE: NORMAL
WBC # FLD AUTO: 4.34 K/UL
WHITE BLOOD CELLS URINE: 3 /HPF

## 2023-05-05 ENCOUNTER — APPOINTMENT (OUTPATIENT)
Dept: PEDIATRIC RHEUMATOLOGY | Facility: CLINIC | Age: 23
End: 2023-05-05

## 2023-05-26 ENCOUNTER — APPOINTMENT (OUTPATIENT)
Dept: PEDIATRIC RHEUMATOLOGY | Facility: CLINIC | Age: 23
End: 2023-05-26
Payer: COMMERCIAL

## 2023-05-26 VITALS
HEIGHT: 59.84 IN | WEIGHT: 81 LBS | TEMPERATURE: 97.1 F | HEART RATE: 93 BPM | BODY MASS INDEX: 15.9 KG/M2 | SYSTOLIC BLOOD PRESSURE: 103 MMHG | DIASTOLIC BLOOD PRESSURE: 70 MMHG

## 2023-05-26 DIAGNOSIS — M25.562 PAIN IN RIGHT KNEE: ICD-10-CM

## 2023-05-26 DIAGNOSIS — Z51.81 ENCOUNTER FOR THERAPEUTIC DRUG LVL MONITORING: ICD-10-CM

## 2023-05-26 DIAGNOSIS — M25.561 PAIN IN RIGHT KNEE: ICD-10-CM

## 2023-05-26 DIAGNOSIS — M35.01 SICCA SYNDROME WITH KERATOCONJUNCTIVITIS: ICD-10-CM

## 2023-05-26 DIAGNOSIS — R76.8 OTHER SPECIFIED ABNORMAL IMMUNOLOGICAL FINDINGS IN SERUM: ICD-10-CM

## 2023-05-26 PROCEDURE — 99215 OFFICE O/P EST HI 40 MIN: CPT

## 2023-05-26 NOTE — CONSULT LETTER
[Dear  ___] : Dear  [unfilled], [Courtesy Letter:] : I had the pleasure of seeing your patient, [unfilled], in my office today. [Please see my note below.] : Please see my note below. [Consult Closing:] : Thank you very much for allowing me to participate in the care of this patient.  If you have any questions, please do not hesitate to contact me. [Sincerely,] : Sincerely, [DrDenise  ___] : Dr. MAURER [FreeTextEntry2] : Blake Ji MD\par [address]\[place] [address]  [FreeTextEntry3] : Negrita Almeida DO\par Attending Physician, Pediatric Rheumatology\par [facility]\par \par

## 2023-05-26 NOTE — SOCIAL HISTORY
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

## 2023-05-26 NOTE — PHYSICAL EXAM
[PERRLA] : ZACKARY [Eyelids] : normal eyelids [Pupils] : pupils were equal and round [Iris] : normal iris [Gums] : normal gums [Mucosa] : moist and pink mucosa [Palate] : normal palate [S1, S2 Present] : S1, S2 present [Cardiac Auscultation] : normal cardiac auscultation  [Respiratory Effort] : normal respiratory effort [Clear to auscultation] : clear to auscultation [Soft] : soft [NonTender] : non tender [Non Distended] : non distended [Normal Bowel Sounds] : normal bowel sounds [No Hepatosplenomegaly] : no hepatosplenomegaly [No Abnormal Lymph Nodes Palpated] : no abnormal lymph nodes palpated [Digits] : normal digits [Muscle Strength] : normal muscle strength [Range Of Motion] : full range of motion [Gait] : normal gait [Cranial nerves grossly intact] : cranial nerves grossly intact [Intact Judgement] : intact judgement  [Insight Insight] : intact insight [1] : 1 [Thumbs bend back to reach forearm] : thumbs bend back to reach forearm [Hyperextension of elbows] : hyperextension of elbows  [Hyperextension of knees] : hyperextension of knees [Acute distress] : no acute distress [Rash] : no rash [Malar Erythema] : no malar erythema [Erythematous Conjunctiva] : nonerythematous conjunctiva [Erythematous Oropharynx] : nonerythematous oropharynx [Lesions] : no lesions [Erythematous] : not erythematous [Murmurs] : no murmurs [Peripheral Edema] : no peripheral edema  [Joint effusions] : no joint effusions [FreeTextEntry1] : NAD, able to get on and off table without difficulty  [de-identified] : no objective arthritis  [NumbJointsActiveArthritis] : 0 [NumbJointsLimitedMotion] : 0 [de-identified] : FROM C-Spine [de-identified] : negative FABERE bilaterally  [de-identified] : no gross discrepancy

## 2023-05-26 NOTE — HISTORY OF PRESENT ILLNESS
[Noncontributory] : The patient's family history was noncontributory [Unlimited ADLs] : able to do activities of daily living without limitations [Unlimited Sports] : able to participate in sports without limitations [FreeTextEntry1] : Melo is here for follow-up today. \par \par Melo is feeling well today without any joint symptoms. Has not needed Meloxicam or any OTC analgesics.\par \par Currently, denies any abdominal pain, vomiting, nausea, diarrhea, early satiety, dysphagia, decrease in appetite, or intentional weight loss. Denies xerostomia, xerophthalmia, or further swelling of cheeks. Has been compliant with HCQ qD and artificial tears PRN. Denies eye pain, vision changes/blurry vision, rashes, fevers, recent illnesses, alopecia, fatigue, constant thirst, other joint symptoms, hematochezia, or hematuria. Denies Raynaud's symptoms.\par

## 2023-05-26 NOTE — IMMUNIZATIONS
[Immunizations are up to date] : Immunizations are up to date [Records maintained by PMANMOL] : Records maintained by GRISEL [FreeTextEntry1] : Flu shot given in rheumatology office [id number]\par COVID vaccine x2 doses completed 5/2021; booster 2/17/2022 [no AEs]

## 2023-05-30 ENCOUNTER — TRANSCRIPTION ENCOUNTER (OUTPATIENT)
Age: 23
End: 2023-05-30

## 2023-05-30 ENCOUNTER — NON-APPOINTMENT (OUTPATIENT)
Age: 23
End: 2023-05-30

## 2023-05-30 LAB
25(OH)D3 SERPL-MCNC: 24.1 NG/ML
ALBUMIN SERPL ELPH-MCNC: 4.6 G/DL
ALP BLD-CCNC: 49 U/L
ALT SERPL-CCNC: 11 U/L
ANION GAP SERPL CALC-SCNC: 12 MMOL/L
APPEARANCE: CLEAR
AST SERPL-CCNC: 18 U/L
BACTERIA: ABNORMAL /HPF
BILIRUB SERPL-MCNC: 0.7 MG/DL
BILIRUBIN URINE: NEGATIVE
BLOOD URINE: NEGATIVE
BUN SERPL-MCNC: 9 MG/DL
C3 SERPL-MCNC: 92 MG/DL
C4 SERPL-MCNC: 17 MG/DL
CALCIUM SERPL-MCNC: 9.5 MG/DL
CARDIOLIPIN AB SER IA-ACNC: NEGATIVE
CAST: 0 /LPF
CHLORIDE SERPL-SCNC: 101 MMOL/L
CO2 SERPL-SCNC: 25 MMOL/L
COLOR: YELLOW
CONFIRM: 27.8 SEC
CREAT SERPL-MCNC: 0.59 MG/DL
CREAT SPEC-SCNC: 213 MG/DL
CREAT/PROT UR: 0.1 RATIO
CRP SERPL-MCNC: <3 MG/L
DRVVT IMM 1:2 NP PPP: NORMAL
DRVVT SCREEN TO CONFIRM RATIO: 0.89 RATIO
EGFR: 131 ML/MIN/1.73M2
ENA RNP AB SER IA-ACNC: <0.2 AL
ENA SM AB SER IA-ACNC: <0.2 AL
ENA SS-A AB SER IA-ACNC: >8 AL
ENA SS-B AB SER IA-ACNC: 1 AL
EPITHELIAL CELLS: 16 /HPF
ERYTHROCYTE [SEDIMENTATION RATE] IN BLOOD BY WESTERGREN METHOD: 40 MM/HR
FERRITIN SERPL-MCNC: 578 NG/ML
GLUCOSE QUALITATIVE U: NEGATIVE MG/DL
GLUCOSE SERPL-MCNC: 85 MG/DL
IRON SATN MFR SERPL: 33 %
IRON SERPL-MCNC: 90 UG/DL
KETONES URINE: NEGATIVE MG/DL
LEUKOCYTE ESTERASE URINE: NEGATIVE
MICROSCOPIC-UA: NORMAL
NITRITE URINE: NEGATIVE
PH URINE: 6.5
POTASSIUM SERPL-SCNC: 3.7 MMOL/L
PROT SERPL-MCNC: 8.1 G/DL
PROT UR-MCNC: 16 MG/DL
PROTEIN URINE: 30 MG/DL
RED BLOOD CELLS URINE: 2 /HPF
SCREEN DRVVT: 29.3 SEC
SODIUM SERPL-SCNC: 138 MMOL/L
SPECIFIC GRAVITY URINE: 1.02
TIBC SERPL-MCNC: 272 UG/DL
TSH SERPL-ACNC: 0.86 UIU/ML
UIBC SERPL-MCNC: 182 UG/DL
UROBILINOGEN URINE: 1 MG/DL
WHITE BLOOD CELLS URINE: 1 /HPF

## 2023-05-31 LAB
B2 GLYCOPROT1 IGA SERPL IA-ACNC: <5 SAU
B2 GLYCOPROT1 IGG SER-ACNC: <5 SGU
B2 GLYCOPROT1 IGM SER-ACNC: <5 SMU
CARDIOLIPIN IGM SER-MCNC: <5 GPL
CARDIOLIPIN IGM SER-MCNC: <5 MPL
DSDNA AB SER-ACNC: <12 IU/ML

## 2023-06-01 LAB — DEPRECATED CARDIOLIPIN IGA SER: <5 APL

## 2023-11-17 ENCOUNTER — APPOINTMENT (OUTPATIENT)
Dept: RHEUMATOLOGY | Facility: CLINIC | Age: 23
End: 2023-11-17
Payer: COMMERCIAL

## 2023-11-17 ENCOUNTER — LABORATORY RESULT (OUTPATIENT)
Age: 23
End: 2023-11-17

## 2023-11-17 VITALS
RESPIRATION RATE: 16 BRPM | HEART RATE: 80 BPM | SYSTOLIC BLOOD PRESSURE: 100 MMHG | DIASTOLIC BLOOD PRESSURE: 68 MMHG | OXYGEN SATURATION: 98 % | TEMPERATURE: 98.2 F | BODY MASS INDEX: 16.93 KG/M2 | WEIGHT: 84 LBS | HEIGHT: 59 IN

## 2023-11-17 DIAGNOSIS — D50.9 IRON DEFICIENCY ANEMIA, UNSPECIFIED: ICD-10-CM

## 2023-11-17 DIAGNOSIS — B99.9 UNSPECIFIED INFECTIOUS DISEASE: ICD-10-CM

## 2023-11-17 DIAGNOSIS — R76.8 OTHER SPECIFIED ABNORMAL IMMUNOLOGICAL FINDINGS IN SERUM: ICD-10-CM

## 2023-11-17 DIAGNOSIS — Z79.899 OTHER LONG TERM (CURRENT) DRUG THERAPY: ICD-10-CM

## 2023-11-17 DIAGNOSIS — R59.0 LOCALIZED ENLARGED LYMPH NODES: ICD-10-CM

## 2023-11-17 PROCEDURE — 99205 OFFICE O/P NEW HI 60 MIN: CPT

## 2023-11-20 DIAGNOSIS — M35.00 SICCA SYNDROME, UNSPECIFIED: ICD-10-CM

## 2023-11-22 LAB
ALBUMIN SERPL ELPH-MCNC: 4.7 G/DL
ALP BLD-CCNC: 59 U/L
ALT SERPL-CCNC: 13 U/L
ANA PAT FLD IF-IMP: ABNORMAL
ANA SER IF-ACNC: ABNORMAL
ANION GAP SERPL CALC-SCNC: 18 MMOL/L
AST SERPL-CCNC: 19 U/L
B2 GLYCOPROT1 AB SER QL: NEGATIVE
BILIRUB SERPL-MCNC: 0.9 MG/DL
BUN SERPL-MCNC: 9 MG/DL
C3 SERPL-MCNC: 109 MG/DL
C4 SERPL-MCNC: 18 MG/DL
CALCIUM SERPL-MCNC: 9.7 MG/DL
CARDIOLIPIN AB SER IA-ACNC: NEGATIVE
CCP AB SER IA-ACNC: <8 UNITS
CENTROMERE IGG SER-ACNC: <0.2 AL
CHLORIDE SERPL-SCNC: 99 MMOL/L
CO2 SERPL-SCNC: 21 MMOL/L
CONFIRM: 29.4 SEC
CREAT SERPL-MCNC: 0.58 MG/DL
CREAT SPEC-SCNC: 114 MG/DL
CREAT/PROT UR: 0.1 RATIO
DEPRECATED KAPPA LC FREE/LAMBDA SER: 1.34 RATIO
DIRECT COOMBS: ABNORMAL
DRVVT IMM 1:2 NP PPP: NORMAL
DRVVT SCREEN TO CONFIRM RATIO: 0.9 RATIO
DSDNA AB SER-ACNC: <12 IU/ML
EGFR: 132 ML/MIN/1.73M2
ENA RNP AB SER IA-ACNC: <0.2 AL
ENA SCL70 IGG SER IA-ACNC: <0.2 AL
ENA SM AB SER IA-ACNC: <0.2 AL
ENA SS-A AB SER IA-ACNC: >8 AL
ENA SS-B AB SER IA-ACNC: 0.6 AL
FERRITIN SERPL-MCNC: 478 NG/ML
G6PD SER-CCNC: 12.5 U/G HGB
HAPTOGLOB SERPL-MCNC: 126 MG/DL
IGA 24H UR QL IFE: NORMAL
IGA SER QL IEP: 307 MG/DL
IGG SER QL IEP: 2433 MG/DL
IGM SER QL IEP: 69 MG/DL
IRON SATN MFR SERPL: 37 %
IRON SERPL-MCNC: 108 UG/DL
KAPPA LC CSF-MCNC: 1.58 MG/DL
KAPPA LC SERPL-MCNC: 2.12 MG/DL
M PROTEIN SPEC IFE-MCNC: NORMAL
POTASSIUM SERPL-SCNC: 3.9 MMOL/L
PROT SERPL-MCNC: 8.7 G/DL
PROT UR-MCNC: 10 MG/DL
RF+CCP IGG SER-IMP: NEGATIVE
RHEUMATOID FACT SER QL: 87 IU/ML
SCREEN DRVVT: 31.2 SEC
SILICA CLOTTING TIME INTERPRETATION: NORMAL
SILICA CLOTTING TIME S/C: 0.83 RATIO
SODIUM SERPL-SCNC: 139 MMOL/L
THYROPEROXIDASE AB SERPL IA-ACNC: <10 IU/ML
TIBC SERPL-MCNC: 295 UG/DL
UIBC SERPL-MCNC: 187 UG/DL

## 2024-01-09 ENCOUNTER — APPOINTMENT (OUTPATIENT)
Dept: RHEUMATOLOGY | Facility: CLINIC | Age: 24
End: 2024-01-09

## 2024-01-11 ENCOUNTER — NON-APPOINTMENT (OUTPATIENT)
Age: 24
End: 2024-01-11

## 2024-01-16 RX ORDER — HYDROXYCHLOROQUINE SULFATE 200 MG/1
200 TABLET, FILM COATED ORAL
Qty: 30 | Refills: 0 | Status: ACTIVE | COMMUNITY
Start: 2020-05-06 | End: 1900-01-01

## 2025-02-21 ENCOUNTER — EMERGENCY (EMERGENCY)
Facility: HOSPITAL | Age: 25
LOS: 1 days | Discharge: HOME | End: 2025-02-21
Attending: EMERGENCY MEDICINE
Payer: COMMERCIAL

## 2025-02-21 VITALS
HEART RATE: 61 BPM | OXYGEN SATURATION: 99 % | WEIGHT: 84.88 LBS | DIASTOLIC BLOOD PRESSURE: 71 MMHG | TEMPERATURE: 98 F | RESPIRATION RATE: 16 BRPM | SYSTOLIC BLOOD PRESSURE: 110 MMHG

## 2025-02-21 LAB
ALBUMIN SERPL ELPH-MCNC: 4.2 G/DL — SIGNIFICANT CHANGE UP (ref 3.5–5)
ALP SERPL-CCNC: 47 U/L — SIGNIFICANT CHANGE UP (ref 40–120)
ALT FLD-CCNC: 17 U/L DA — SIGNIFICANT CHANGE UP (ref 10–60)
ANION GAP SERPL CALC-SCNC: 3 MMOL/L — LOW (ref 5–17)
APPEARANCE UR: CLEAR — SIGNIFICANT CHANGE UP
AST SERPL-CCNC: 18 U/L — SIGNIFICANT CHANGE UP (ref 10–40)
BASOPHILS # BLD AUTO: 0.02 K/UL — SIGNIFICANT CHANGE UP (ref 0–0.2)
BASOPHILS NFR BLD AUTO: 0.4 % — SIGNIFICANT CHANGE UP (ref 0–2)
BILIRUB SERPL-MCNC: 0.8 MG/DL — SIGNIFICANT CHANGE UP (ref 0.2–1.2)
BILIRUB UR-MCNC: NEGATIVE — SIGNIFICANT CHANGE UP
BUN SERPL-MCNC: 8 MG/DL — SIGNIFICANT CHANGE UP (ref 7–18)
CALCIUM SERPL-MCNC: 9.3 MG/DL — SIGNIFICANT CHANGE UP (ref 8.4–10.5)
CHLORIDE SERPL-SCNC: 103 MMOL/L — SIGNIFICANT CHANGE UP (ref 96–108)
CO2 SERPL-SCNC: 27 MMOL/L — SIGNIFICANT CHANGE UP (ref 22–31)
COLOR SPEC: YELLOW — SIGNIFICANT CHANGE UP
CREAT SERPL-MCNC: 0.55 MG/DL — SIGNIFICANT CHANGE UP (ref 0.5–1.3)
DIFF PNL FLD: NEGATIVE — SIGNIFICANT CHANGE UP
EGFR: 133 ML/MIN/1.73M2 — SIGNIFICANT CHANGE UP
EOSINOPHIL # BLD AUTO: 0.05 K/UL — SIGNIFICANT CHANGE UP (ref 0–0.5)
EOSINOPHIL NFR BLD AUTO: 0.9 % — SIGNIFICANT CHANGE UP (ref 0–6)
FLUAV AG NPH QL: SIGNIFICANT CHANGE UP
FLUBV AG NPH QL: SIGNIFICANT CHANGE UP
GLUCOSE SERPL-MCNC: 81 MG/DL — SIGNIFICANT CHANGE UP (ref 70–99)
GLUCOSE UR QL: NEGATIVE MG/DL — SIGNIFICANT CHANGE UP
HCG SERPL-ACNC: <1 MIU/ML — SIGNIFICANT CHANGE UP
HCT VFR BLD CALC: 36 % — SIGNIFICANT CHANGE UP (ref 34.5–45)
HGB BLD-MCNC: 11.6 G/DL — SIGNIFICANT CHANGE UP (ref 11.5–15.5)
IMM GRANULOCYTES NFR BLD AUTO: 0.2 % — SIGNIFICANT CHANGE UP (ref 0–0.9)
KETONES UR-MCNC: ABNORMAL MG/DL
LEUKOCYTE ESTERASE UR-ACNC: ABNORMAL
LIDOCAIN IGE QN: 24 U/L — SIGNIFICANT CHANGE UP (ref 13–75)
LYMPHOCYTES # BLD AUTO: 2.07 K/UL — SIGNIFICANT CHANGE UP (ref 1–3.3)
LYMPHOCYTES # BLD AUTO: 38.8 % — SIGNIFICANT CHANGE UP (ref 13–44)
MAGNESIUM SERPL-MCNC: 1.8 MG/DL — SIGNIFICANT CHANGE UP (ref 1.6–2.6)
MCHC RBC-ENTMCNC: 27 PG — SIGNIFICANT CHANGE UP (ref 27–34)
MCHC RBC-ENTMCNC: 32.2 G/DL — SIGNIFICANT CHANGE UP (ref 32–36)
MCV RBC AUTO: 83.7 FL — SIGNIFICANT CHANGE UP (ref 80–100)
MONOCYTES # BLD AUTO: 0.31 K/UL — SIGNIFICANT CHANGE UP (ref 0–0.9)
MONOCYTES NFR BLD AUTO: 5.8 % — SIGNIFICANT CHANGE UP (ref 2–14)
NEUTROPHILS # BLD AUTO: 2.88 K/UL — SIGNIFICANT CHANGE UP (ref 1.8–7.4)
NEUTROPHILS NFR BLD AUTO: 53.9 % — SIGNIFICANT CHANGE UP (ref 43–77)
NITRITE UR-MCNC: NEGATIVE — SIGNIFICANT CHANGE UP
NRBC BLD AUTO-RTO: 0 /100 WBCS — SIGNIFICANT CHANGE UP (ref 0–0)
PH UR: 6 — SIGNIFICANT CHANGE UP (ref 5–8)
PLATELET # BLD AUTO: 253 K/UL — SIGNIFICANT CHANGE UP (ref 150–400)
POTASSIUM SERPL-MCNC: 3.7 MMOL/L — SIGNIFICANT CHANGE UP (ref 3.5–5.3)
POTASSIUM SERPL-SCNC: 3.7 MMOL/L — SIGNIFICANT CHANGE UP (ref 3.5–5.3)
PROT SERPL-MCNC: 9.4 G/DL — HIGH (ref 6–8.3)
PROT UR-MCNC: 30 MG/DL
RBC # BLD: 4.3 M/UL — SIGNIFICANT CHANGE UP (ref 3.8–5.2)
RBC # FLD: 13.3 % — SIGNIFICANT CHANGE UP (ref 10.3–14.5)
RSV RNA NPH QL NAA+NON-PROBE: SIGNIFICANT CHANGE UP
SARS-COV-2 RNA SPEC QL NAA+PROBE: SIGNIFICANT CHANGE UP
SODIUM SERPL-SCNC: 133 MMOL/L — LOW (ref 135–145)
SP GR SPEC: 1.02 — SIGNIFICANT CHANGE UP (ref 1–1.03)
UROBILINOGEN FLD QL: 0.2 MG/DL — SIGNIFICANT CHANGE UP (ref 0.2–1)
WBC # BLD: 5.34 K/UL — SIGNIFICANT CHANGE UP (ref 3.8–10.5)
WBC # FLD AUTO: 5.34 K/UL — SIGNIFICANT CHANGE UP (ref 3.8–10.5)

## 2025-02-21 PROCEDURE — 99284 EMERGENCY DEPT VISIT MOD MDM: CPT | Mod: 25

## 2025-02-21 PROCEDURE — 96361 HYDRATE IV INFUSION ADD-ON: CPT

## 2025-02-21 PROCEDURE — 83735 ASSAY OF MAGNESIUM: CPT

## 2025-02-21 PROCEDURE — 84702 CHORIONIC GONADOTROPIN TEST: CPT

## 2025-02-21 PROCEDURE — 96375 TX/PRO/DX INJ NEW DRUG ADDON: CPT

## 2025-02-21 PROCEDURE — 80053 COMPREHEN METABOLIC PANEL: CPT

## 2025-02-21 PROCEDURE — 87637 SARSCOV2&INF A&B&RSV AMP PRB: CPT

## 2025-02-21 PROCEDURE — 93005 ELECTROCARDIOGRAM TRACING: CPT

## 2025-02-21 PROCEDURE — 96374 THER/PROPH/DIAG INJ IV PUSH: CPT

## 2025-02-21 PROCEDURE — 99284 EMERGENCY DEPT VISIT MOD MDM: CPT

## 2025-02-21 PROCEDURE — 85025 COMPLETE CBC W/AUTO DIFF WBC: CPT

## 2025-02-21 PROCEDURE — 81001 URINALYSIS AUTO W/SCOPE: CPT

## 2025-02-21 PROCEDURE — 36415 COLL VENOUS BLD VENIPUNCTURE: CPT

## 2025-02-21 PROCEDURE — 83690 ASSAY OF LIPASE: CPT

## 2025-02-21 RX ORDER — BACTERIOSTATIC SODIUM CHLORIDE 0.9 %
1000 VIAL (ML) INJECTION ONCE
Refills: 0 | Status: COMPLETED | OUTPATIENT
Start: 2025-02-21 | End: 2025-02-21

## 2025-02-21 RX ORDER — FAMOTIDINE 10 MG/ML
20 INJECTION INTRAVENOUS ONCE
Refills: 0 | Status: COMPLETED | OUTPATIENT
Start: 2025-02-21 | End: 2025-02-21

## 2025-02-21 RX ORDER — ONDANSETRON 4 MG/1
4 TABLET, ORALLY DISINTEGRATING ORAL ONCE
Refills: 0 | Status: COMPLETED | OUTPATIENT
Start: 2025-02-21 | End: 2025-02-21

## 2025-02-21 RX ADMIN — Medication 1000 MILLILITER(S): at 14:38

## 2025-02-21 RX ADMIN — Medication 1000 MILLILITER(S): at 15:38

## 2025-02-21 RX ADMIN — ONDANSETRON 4 MILLIGRAM(S): 4 TABLET, ORALLY DISINTEGRATING ORAL at 14:38

## 2025-02-21 RX ADMIN — FAMOTIDINE 20 MILLIGRAM(S): 10 INJECTION INTRAVENOUS at 14:37

## 2025-02-21 NOTE — ED ADULT TRIAGE NOTE - NSWEIGHTCALCTOOLDRUG_GEN_A_CORE
No provider result note found associated with the 2/19/20 xray result. Please review and advise.    DARSHANA NguyenN, RN    
No signs of lymphoma  
Patient would like the results of his chest xray from over a week ago. Ok to leave a detailed message.    
Pt notified of provider message as written.  Pt verbalized good understanding.  Cher Villeda BSN, RN      
 used

## 2025-02-21 NOTE — ED PROVIDER NOTE - OBJECTIVE STATEMENT
24-year-old female with PMH Sjogren's and RA presenting with 2d of diarrhea after eating, dizziness, SB, and weakness. She also notes mild blurry vision, palpitations, and headache intermittently. She denies abdominal pain, vomiting, dysuria, and chest pain. Her symptoms have not been palliated by anything. She notes having a flu-like illness in December 2024, treated with abx. She also notes having a recent M-spike in her protein, being seen by a hematologist in March.

## 2025-02-21 NOTE — ED PROVIDER NOTE - PHYSICAL EXAMINATION
General: sitting comfortably in chair  Neuro: A&ox3, no focal deficits  HEENT: MMM, no cervical lymphadenopathy  CV: RRR, no MRG  Pulm: clear breath sounds b/l, no WRR  Abd: NTND, +BSx4  Ex: no edema, distal pulses +

## 2025-02-21 NOTE — ED PROVIDER NOTE - PROGRESS NOTE DETAILS
connie:Labs are unremarkable.  Flu/COVID/RSV is negative.  Urinalysis consistent with UTI.  Patient states did have a couple of days of mild dysuria.  Will treat for UTI.  Advised to maintain hydration.  Follow-up with primary care doctor.  Return precautions discussed.

## 2025-02-21 NOTE — ED PROVIDER NOTE - PATIENT PORTAL LINK FT
You can access the FollowMyHealth Patient Portal offered by [facility] by registering at the following website: [email or web address]. By joining [employer]’s FollowMyHealth portal, you will also be able to view your health information using other applications (apps) compatible with our system.

## 2025-02-21 NOTE — ED PROVIDER NOTE - CLINICAL SUMMARY MEDICAL DECISION MAKING FREE TEXT BOX
24-year-old female with PMH SS and RA presenting with diarrhea after eating, shortness of breath, and dizziness for the past 2 days. No abdominal pain or vomiting. Associated headache, palpitations, and blurry vision. Differential includes but is not limited to gastroenteritis, pancreatitis, and dyspepsia. Will order CBC, CMP, lipase, magnesium, stool PCR, and Covid/RSV/Flu. Will give fluids, famotidine, and zofran.

## 2025-06-18 ENCOUNTER — EMERGENCY (EMERGENCY)
Facility: HOSPITAL | Age: 25
LOS: 1 days | End: 2025-06-18
Attending: STUDENT IN AN ORGANIZED HEALTH CARE EDUCATION/TRAINING PROGRAM
Payer: COMMERCIAL

## 2025-06-18 VITALS
SYSTOLIC BLOOD PRESSURE: 117 MMHG | HEART RATE: 96 BPM | RESPIRATION RATE: 18 BRPM | TEMPERATURE: 99 F | DIASTOLIC BLOOD PRESSURE: 71 MMHG | OXYGEN SATURATION: 98 %

## 2025-06-18 VITALS
HEART RATE: 103 BPM | RESPIRATION RATE: 18 BRPM | SYSTOLIC BLOOD PRESSURE: 116 MMHG | OXYGEN SATURATION: 99 % | WEIGHT: 84.66 LBS | TEMPERATURE: 98 F | HEIGHT: 60 IN | DIASTOLIC BLOOD PRESSURE: 77 MMHG

## 2025-06-18 LAB
ALBUMIN SERPL ELPH-MCNC: 3.9 G/DL — SIGNIFICANT CHANGE UP (ref 3.5–5)
ALP SERPL-CCNC: 41 U/L — SIGNIFICANT CHANGE UP (ref 40–120)
ALT FLD-CCNC: 18 U/L DA — SIGNIFICANT CHANGE UP (ref 10–60)
ANION GAP SERPL CALC-SCNC: 5 MMOL/L — SIGNIFICANT CHANGE UP (ref 5–17)
AST SERPL-CCNC: 19 U/L — SIGNIFICANT CHANGE UP (ref 10–40)
BASOPHILS # BLD AUTO: 0.01 K/UL — SIGNIFICANT CHANGE UP (ref 0–0.2)
BASOPHILS NFR BLD AUTO: 0.2 % — SIGNIFICANT CHANGE UP (ref 0–2)
BILIRUB SERPL-MCNC: 0.8 MG/DL — SIGNIFICANT CHANGE UP (ref 0.2–1.2)
BUN SERPL-MCNC: 4 MG/DL — LOW (ref 7–18)
CALCIUM SERPL-MCNC: 9 MG/DL — SIGNIFICANT CHANGE UP (ref 8.4–10.5)
CHLORIDE SERPL-SCNC: 107 MMOL/L — SIGNIFICANT CHANGE UP (ref 96–108)
CO2 SERPL-SCNC: 23 MMOL/L — SIGNIFICANT CHANGE UP (ref 22–31)
CREAT SERPL-MCNC: 0.5 MG/DL — SIGNIFICANT CHANGE UP (ref 0.5–1.3)
EGFR: 135 ML/MIN/1.73M2 — SIGNIFICANT CHANGE UP
EGFR: 135 ML/MIN/1.73M2 — SIGNIFICANT CHANGE UP
EOSINOPHIL # BLD AUTO: 0.02 K/UL — SIGNIFICANT CHANGE UP (ref 0–0.5)
EOSINOPHIL NFR BLD AUTO: 0.5 % — SIGNIFICANT CHANGE UP (ref 0–6)
GLUCOSE SERPL-MCNC: 84 MG/DL — SIGNIFICANT CHANGE UP (ref 70–99)
HCT VFR BLD CALC: 29.8 % — LOW (ref 34.5–45)
HGB BLD-MCNC: 9.8 G/DL — LOW (ref 11.5–15.5)
IMM GRANULOCYTES NFR BLD AUTO: 0 % — SIGNIFICANT CHANGE UP (ref 0–0.9)
LIDOCAIN IGE QN: 14 U/L — SIGNIFICANT CHANGE UP (ref 13–75)
LYMPHOCYTES # BLD AUTO: 1.58 K/UL — SIGNIFICANT CHANGE UP (ref 1–3.3)
LYMPHOCYTES # BLD AUTO: 36.1 % — SIGNIFICANT CHANGE UP (ref 13–44)
MAGNESIUM SERPL-MCNC: 1.7 MG/DL — SIGNIFICANT CHANGE UP (ref 1.6–2.6)
MCHC RBC-ENTMCNC: 27.3 PG — SIGNIFICANT CHANGE UP (ref 27–34)
MCHC RBC-ENTMCNC: 32.9 G/DL — SIGNIFICANT CHANGE UP (ref 32–36)
MCV RBC AUTO: 83 FL — SIGNIFICANT CHANGE UP (ref 80–100)
MONOCYTES # BLD AUTO: 0.29 K/UL — SIGNIFICANT CHANGE UP (ref 0–0.9)
MONOCYTES NFR BLD AUTO: 6.6 % — SIGNIFICANT CHANGE UP (ref 2–14)
NEUTROPHILS # BLD AUTO: 2.48 K/UL — SIGNIFICANT CHANGE UP (ref 1.8–7.4)
NEUTROPHILS NFR BLD AUTO: 56.6 % — SIGNIFICANT CHANGE UP (ref 43–77)
NRBC BLD AUTO-RTO: 0 /100 WBCS — SIGNIFICANT CHANGE UP (ref 0–0)
PHOSPHATE SERPL-MCNC: 2.6 MG/DL — SIGNIFICANT CHANGE UP (ref 2.5–4.5)
PLATELET # BLD AUTO: 185 K/UL — SIGNIFICANT CHANGE UP (ref 150–400)
POTASSIUM SERPL-MCNC: 3.3 MMOL/L — LOW (ref 3.5–5.3)
POTASSIUM SERPL-SCNC: 3.3 MMOL/L — LOW (ref 3.5–5.3)
PROT SERPL-MCNC: 8.4 G/DL — HIGH (ref 6–8.3)
RBC # BLD: 3.59 M/UL — LOW (ref 3.8–5.2)
RBC # FLD: 12.3 % — SIGNIFICANT CHANGE UP (ref 10.3–14.5)
SODIUM SERPL-SCNC: 135 MMOL/L — SIGNIFICANT CHANGE UP (ref 135–145)
WBC # BLD: 4.38 K/UL — SIGNIFICANT CHANGE UP (ref 3.8–10.5)
WBC # FLD AUTO: 4.38 K/UL — SIGNIFICANT CHANGE UP (ref 3.8–10.5)

## 2025-06-18 PROCEDURE — 84100 ASSAY OF PHOSPHORUS: CPT

## 2025-06-18 PROCEDURE — 96375 TX/PRO/DX INJ NEW DRUG ADDON: CPT

## 2025-06-18 PROCEDURE — 83735 ASSAY OF MAGNESIUM: CPT

## 2025-06-18 PROCEDURE — 96374 THER/PROPH/DIAG INJ IV PUSH: CPT

## 2025-06-18 PROCEDURE — 96376 TX/PRO/DX INJ SAME DRUG ADON: CPT

## 2025-06-18 PROCEDURE — 36415 COLL VENOUS BLD VENIPUNCTURE: CPT

## 2025-06-18 PROCEDURE — 99284 EMERGENCY DEPT VISIT MOD MDM: CPT | Mod: 25

## 2025-06-18 PROCEDURE — 84702 CHORIONIC GONADOTROPIN TEST: CPT

## 2025-06-18 PROCEDURE — 99284 EMERGENCY DEPT VISIT MOD MDM: CPT

## 2025-06-18 PROCEDURE — 80053 COMPREHEN METABOLIC PANEL: CPT

## 2025-06-18 PROCEDURE — 85025 COMPLETE CBC W/AUTO DIFF WBC: CPT

## 2025-06-18 PROCEDURE — 83690 ASSAY OF LIPASE: CPT

## 2025-06-18 RX ORDER — ONDANSETRON HCL/PF 4 MG/2 ML
4 VIAL (ML) INJECTION ONCE
Refills: 0 | Status: COMPLETED | OUTPATIENT
Start: 2025-06-18 | End: 2025-06-18

## 2025-06-18 RX ORDER — ACETAMINOPHEN 500 MG/5ML
575 LIQUID (ML) ORAL ONCE
Refills: 0 | Status: COMPLETED | OUTPATIENT
Start: 2025-06-18 | End: 2025-06-18

## 2025-06-18 RX ORDER — ONDANSETRON HCL/PF 4 MG/2 ML
1 VIAL (ML) INJECTION
Qty: 1 | Refills: 0
Start: 2025-06-18

## 2025-06-18 RX ADMIN — Medication 4 MILLIGRAM(S): at 22:44

## 2025-06-18 RX ADMIN — Medication 230 MILLIGRAM(S): at 21:15

## 2025-06-18 RX ADMIN — Medication 1000 MILLILITER(S): at 21:02

## 2025-06-18 RX ADMIN — Medication 40 MILLIEQUIVALENT(S): at 21:57

## 2025-06-18 RX ADMIN — Medication 4 MILLIGRAM(S): at 21:02
